# Patient Record
Sex: MALE | Race: WHITE | NOT HISPANIC OR LATINO | Employment: UNEMPLOYED | URBAN - METROPOLITAN AREA
[De-identification: names, ages, dates, MRNs, and addresses within clinical notes are randomized per-mention and may not be internally consistent; named-entity substitution may affect disease eponyms.]

---
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
## 2024-07-17 ENCOUNTER — APPOINTMENT (OUTPATIENT)
Dept: ULTRASOUND IMAGING | Facility: CLINIC | Age: 24
End: 2024-07-17
Attending: STUDENT IN AN ORGANIZED HEALTH CARE EDUCATION/TRAINING PROGRAM
Payer: COMMERCIAL

## 2024-07-17 ENCOUNTER — HOSPITAL ENCOUNTER (EMERGENCY)
Facility: CLINIC | Age: 24
Discharge: HOME | End: 2024-07-17
Attending: STUDENT IN AN ORGANIZED HEALTH CARE EDUCATION/TRAINING PROGRAM | Admitting: STUDENT IN AN ORGANIZED HEALTH CARE EDUCATION/TRAINING PROGRAM
Payer: COMMERCIAL

## 2024-07-17 VITALS
DIASTOLIC BLOOD PRESSURE: 78 MMHG | RESPIRATION RATE: 18 BRPM | SYSTOLIC BLOOD PRESSURE: 138 MMHG | TEMPERATURE: 98.1 F | HEART RATE: 87 BPM | OXYGEN SATURATION: 100 %

## 2024-07-17 DIAGNOSIS — N45.1 EPIDIDYMITIS: ICD-10-CM

## 2024-07-17 DIAGNOSIS — N50.812 TESTICULAR PAIN, LEFT: Primary | ICD-10-CM

## 2024-07-17 PROCEDURE — 93976 VASCULAR STUDY: CPT

## 2024-07-17 PROCEDURE — 76870 US EXAM SCROTUM: CPT

## 2024-07-17 PROCEDURE — 99284 EMERGENCY DEPT VISIT MOD MDM: CPT | Mod: 25

## 2024-07-17 RX ORDER — LEVOFLOXACIN 500 MG/1
500 TABLET, FILM COATED ORAL DAILY
Qty: 10 TABLET | Refills: 0 | Status: SHIPPED | OUTPATIENT
Start: 2024-07-17 | End: 2024-07-27

## 2024-07-17 ASSESSMENT — COLUMBIA-SUICIDE SEVERITY RATING SCALE - C-SSRS
1. IN THE PAST MONTH, HAVE YOU WISHED YOU WERE DEAD OR WISHED YOU COULD GO TO SLEEP AND NOT WAKE UP?: NO
6. HAVE YOU EVER DONE ANYTHING, STARTED TO DO ANYTHING, OR PREPARED TO DO ANYTHING TO END YOUR LIFE?: NO
2. HAVE YOU ACTUALLY HAD ANY THOUGHTS OF KILLING YOURSELF IN THE PAST MONTH?: NO

## 2024-07-17 ASSESSMENT — ACTIVITIES OF DAILY LIVING (ADL)
ADLS_ACUITY_SCORE: 35

## 2024-07-17 NOTE — ED TRIAGE NOTES
Patient has lower abdominal/testicular pain. Was seen at [place] earlier this week and patient didn't feel like he did a good job advocating for himself and was discharged without imaging or an answer. Patient's father is here with him and is helpful.     They are thinking that patient should have some type of imaging. They are concerned about a hernia. Primary MD has ruled out scrotal cancer with physical assessment.

## 2024-07-17 NOTE — ED PROVIDER NOTES
Emergency Department Note      History of Present Illness     Chief Complaint   Abdominal Pain    HPI   Colt Wade is a very pleasant 24 year old male presenting with abdominal pain. The patient reports that 2 months ago he felt a lump in his left testicle while in the shower. He was initially evaluated by his doctor who did a physical exam and did not notice or feel anything. Patient then went on a road trip and developed a dull pain in his lower left abdomen/groin that radiated upwards. The pain has always been dull, but it occasionally flares up to a intense pain and he notes that it has been progressively worsening. He states that he went to the ED 2 days ago, but he only told them about his abdominal pain and not his testicular pain. During this visit, patient gave a urine sample and they ruled out kidney stones, however no imaging was done and father states he is concerned as they didn't have all of the information during this visit. Patient states this pain is preventing him from being active. He mentions that lately he has only been having one bowel movement a day, and they are smaller than normal, but he attributes this to not eating much and feeling nervous. Dad reports that this pain has been causing the patient a lot of anxiety. Denies dysuria, hematuria, concerns for STD, fever, or chills.  He was last sexually active more than a year ago.  He does masturbate.    Independent Historian   Father as detailed above.    Review of External Notes   I reviewed emergency department note from 7/15/24, providing information about patient's initial assessment and urinalysis, which was bland    I personally reviewed the patient's chart, including available medication list and available past medical history, past surgical history, family history, and social history.    Physical Exam     Patient Vitals for the past 24 hrs:   BP Temp Temp src Pulse Resp SpO2   07/17/24 0835 138/78 98.1  F (36.7  C)  Temporal 87 18 100 %      Physical Exam  Vitals and nursing note reviewed. Exam conducted with a chaperone present.   Constitutional:       Appearance: He is well-developed.   Abdominal:      General: Abdomen is flat. There is no distension.      Palpations: Abdomen is soft.      Tenderness: There is no abdominal tenderness. There is no guarding or rebound.   Genitourinary:     Penis: Normal.       Testes: Cremasteric reflex is present.         Right: Mass, tenderness or swelling not present.         Left: Tenderness present. Mass or swelling not present.   Skin:     General: Skin is warm and dry.      Findings: No rash.   Neurological:      Mental Status: He is alert and oriented to person, place, and time.            Diagnostics     Imaging   US Testicular & Scrotum w Doppler Ltd   Final Result   IMPRESSION:   1.  Mild increased vascularity in the left epididymal head suggestive   of mild epididymitis.   2.  A few prominent veins in the left lateral scrotum which do not   meet size criteria for varicocele.      VIV BROOKS MD            SYSTEM ID:  [id number]        EKG  No ECG performed.     Independent Interpretation   See ED Course below    ED Course      Medications Administered   Medications - No data to display    Procedures   Procedures   None performed    Discussion of Management   See ED Course below    Social Determinants of Health adding to complexity of care   None.      ED Course   Independent Interpretation / Discussion of Management / Repeat Assessments  ED Course as of 07/17/24 1425   Wed Jul 17, 2024   0857 I obtained history and examined the patient as noted above.    1055 I rechecked and updated the patient.      Medical Decision Making / Diagnosis     CMS Diagnoses: None    MIPS       None    MDM   Patient presenting with left testicular pain.  Vital signs are reassuring.  I reviewed workup performed at outside emergency department 2 days ago and do not feel that additional lab work is  indicated at this time.  Considered differential including malignancy, epididymitis, varicocele, hernia, hydrocele, among others.  Low suspicion for testicular torsion based on duration of symptoms and exam.  There is no evidence of hernia on exam.  Obtained ultrasound which does suggest epididymitis.  There was also some enlarged veins, though not meeting criteria for varicocele.  Question whether these could be contributing to the patient's abnormal sensation to palpation.  For now, will treat empirically with levofloxacin for known STD epididymitis, for 10 days.  Will also place a referral for urology for reevaluation if patient's symptoms are not improved after treatment course.     Disposition   The patient was discharged.     Diagnosis     ICD-10-CM    1. Testicular pain, left  N50.812 Adult Urology  Referral      2. Epididymitis  N45.1 Adult Urology  Referral         Discharge Medications   Discharge Medication List as of 7/17/2024 11:11 AM        START taking these medications    Details   levofloxacin (LEVAQUIN) 500 MG tablet Take 1 tablet (500 mg) by mouth daily for 10 days, Disp-10 tablet, R-0, E-Prescribe           Scribe Disclosure:  ESTUARDO COTTRELL, am serving as a scribe at 8:59 AM on 7/17/2024 to document services personally performed by Fede Proctor MD based on my observations and the provider's statements to me.      Fede Proctor MD  07/17/24 3219